# Patient Record
(demographics unavailable — no encounter records)

---

## 2024-10-09 NOTE — ASSESSMENT
[FreeTextEntry1] : Shanell seems to be maintaining a stable health condition for her age. Her immediate concerns around weight gain, hearing deterioration will be addressed.

## 2024-10-09 NOTE — HEALTH RISK ASSESSMENT
[No] : No [Never (0 pts)] : Never (0 points) [0] : 2) Feeling down, depressed, or hopeless: Not at all (0) [No falls in past year] : Patient reported no falls in the past year [PHQ-2 Negative - No further assessment needed] : PHQ-2 Negative - No further assessment needed [I have developed a follow-up plan documented below in the note.] : I have developed a follow-up plan documented below in the note. [[id number]] : 0 [Never] : Never [MammogramDate] : 10/21 [BoneDensityDate] : 06/23 [ColonoscopyDate] : 05/22

## 2024-10-09 NOTE — HISTORY OF PRESENT ILLNESS
[de-identified] :  Shanell shared that she has been taking her prescribed Sertraline medication daily, adjusting the dosage on her own. Her last consultation with Dr. Gonzales yielded positive results and she's presently on two blood pressure medications. She's also due for a bone density scan next year.Shanell has been operated on for thyroid nodules and has continued to present these nodules over the years. - Past Surgical History : She has previously undergone partial thyroidectomy. - Family History : Not provided in this conversation. - Social History : Shanell leads an independent life and takes care of her own condo. However, lately she has been unable to engage in as much exercise as she used to due to a lack-lustre summer.

## 2024-10-09 NOTE — PLAN
[FreeTextEntry1] :  Shanell is advised to keep a close watch on her dietary habits, especially carbohydrate intake, to manage her weight. Her blood work and thyroid ultrasound have been ordered to maintain a check on her thyroid nodules and potential hematological abnormalities. Further, she's being recommended to seek consultation with an audiologist to address her deteriorating hearing. - Plan : - Regular intake of prescribed medication.  - Restrict dietary carbohydrates to resolve weight gain issue.  - Maintain routine health check-ups.  - Consult with an audiologist for hearing issues. Continue with her blood pressure medications.  - Perform thyroid ultrasound and bloodwork.

## 2024-10-09 NOTE — HEALTH RISK ASSESSMENT
[No] : No [Never (0 pts)] : Never (0 points) [0] : 2) Feeling down, depressed, or hopeless: Not at all (0) [No falls in past year] : Patient reported no falls in the past year [PHQ-2 Negative - No further assessment needed] : PHQ-2 Negative - No further assessment needed [I have developed a follow-up plan documented below in the note.] : I have developed a follow-up plan documented below in the note. [[id number]] : 0 [Never] : Never [MammogramDate] : 10/21 [BoneDensityDate] : 06/23 [ColonoscopyDate] : 05/22

## 2024-10-09 NOTE — HISTORY OF PRESENT ILLNESS
[de-identified] :  Shanell shared that she has been taking her prescribed Sertraline medication daily, adjusting the dosage on her own. Her last consultation with Dr. Gonzales yielded positive results and she's presently on two blood pressure medications. She's also due for a bone density scan next year.Shanell has been operated on for thyroid nodules and has continued to present these nodules over the years. - Past Surgical History : She has previously undergone partial thyroidectomy. - Family History : Not provided in this conversation. - Social History : Shanell leads an independent life and takes care of her own condo. However, lately she has been unable to engage in as much exercise as she used to due to a lack-lustre summer.